# Patient Record
Sex: FEMALE | Race: WHITE | NOT HISPANIC OR LATINO | URBAN - METROPOLITAN AREA
[De-identification: names, ages, dates, MRNs, and addresses within clinical notes are randomized per-mention and may not be internally consistent; named-entity substitution may affect disease eponyms.]

---

## 2018-04-13 LAB
C TRACH RRNA SPEC QL PROBE: NEGATIVE
N GONORRHOEA RRNA SPEC QL PROBE: NEGATIVE

## 2018-05-14 ENCOUNTER — ROUTINE PRENATAL (OUTPATIENT)
Dept: PERINATAL CARE | Facility: CLINIC | Age: 28
End: 2018-05-14
Payer: COMMERCIAL

## 2018-05-14 ENCOUNTER — OFFICE VISIT (OUTPATIENT)
Dept: PERINATAL CARE | Facility: CLINIC | Age: 28
End: 2018-05-14
Payer: COMMERCIAL

## 2018-05-14 VITALS
SYSTOLIC BLOOD PRESSURE: 113 MMHG | HEART RATE: 72 BPM | BODY MASS INDEX: 24.62 KG/M2 | HEIGHT: 70 IN | WEIGHT: 172 LBS | DIASTOLIC BLOOD PRESSURE: 66 MMHG

## 2018-05-14 VITALS
WEIGHT: 172 LBS | HEART RATE: 72 BPM | BODY MASS INDEX: 24.62 KG/M2 | SYSTOLIC BLOOD PRESSURE: 113 MMHG | HEIGHT: 70 IN | DIASTOLIC BLOOD PRESSURE: 66 MMHG

## 2018-05-14 DIAGNOSIS — Z3A.13 13 WEEKS GESTATION OF PREGNANCY: ICD-10-CM

## 2018-05-14 DIAGNOSIS — O28.3 INCREASED NUCHAL TRANSLUCENCY SPACE ON FETAL ULTRASOUND: Primary | ICD-10-CM

## 2018-05-14 DIAGNOSIS — Z36.82 ENCOUNTER FOR ANTENATAL SCREENING FOR NUCHAL TRANSLUCENCY: ICD-10-CM

## 2018-05-14 PROCEDURE — 76813 OB US NUCHAL MEAS 1 GEST: CPT | Performed by: OBSTETRICS & GYNECOLOGY

## 2018-05-14 PROCEDURE — 76801 OB US < 14 WKS SINGLE FETUS: CPT | Performed by: OBSTETRICS & GYNECOLOGY

## 2018-05-14 PROCEDURE — 99241 PR OFFICE CONSULTATION NEW/ESTAB PATIENT 15 MIN: CPT | Performed by: OBSTETRICS & GYNECOLOGY

## 2018-05-14 NOTE — PROGRESS NOTES
Genetic Counseling Note        Marcos May     Appointment Date:  2018  Referred By: No ref  provider found  YOB: 1990  Partner:  Emile Roe    Pregnancy History:   Estimated Date of Delivery: 18   Estimated Gestational Age: 17 weeks 1 day     Genetic Counseling:abnormal ultrasound screen    Nathan Forbes is a(n) 33 y o  female who is here to discuss concerns related to an increased nuchal translucency detected on ultrasound at outside institution    Issues Discussed:  Average population risk: 3-4% in the average pregnancy of serious condition or birth defect  2-3% risk of mental retardation  Not all detected by prenatal testing  Ultrasound findings    Options Discussed:  Amniocentesis: risks and limitations discussed  CVS: risks and limitations discussed  Ethnic screening discussed: clinical and genetic basis of CF, SMA, Fragile X  Level II ultrasound to screen for structural anomalies  Nuchal translucency/1st trimester serum screen: goals and limitations discussed  Serum AFP screen recommended at 15-17 weeks to check for open neural tube defects  Cell free fetal DNA testing  Fetal echocardiogram    Additional Information / Impression / Plan / Tests Ordered: Nathan Forbes presents for genetic counseling to discuss concerns related to an increased nuchal translucency detected on ultrasound evaluation through her obstetrician's office  Records indicate that the nuchal translucency measurement was 3 1 mm  A nuchal translucency of this size is associated with an estimated 3 7% risk for a chromosome abnormality with an additional 2 5% risk for major physical anomaly such as a congenital heart defect and a 1 3% risk for fetal death  Overall, there is a 93% chance that this baby will be alive and well at delivery  After discussing the available prenatal diagnostic and screening procedures Nathan Forbes elected to decline prenatal diagnostic testing at this time    She is scheduled to have a follow-up ultrasound evaluation following the genetic counseling session and indicated that she already had blood drawn for cell free fetal DNA testing through her obstetrician's office  She is planning to pursue level 2 ultrasound evaluation and consideration of fetal echocardiogram at the appropriate times  During our counseling session histories were taken on the patient's family and her 's family  Scooby Clay reports that her  has a 15year-old son, with a previous partner, with a diagnosis on the autism spectrum  She indicates that this child is very high functioning and currently mainstreamed into a regular school at his appropriate grade level  We reviewed the multifactorial inheritance of autism spectrum disorders and the increased risk of approximately 5-10% for this child to did a developed a similar condition  In the absence of a known genetic diagnosis prenatal diagnosis for this condition would not be available for the current pregnancy  The patient was encouraged to make her pediatrician aware of this history  In reviewing her own family history Scooby Clay reports having a niece, her sister's daughter, with a diagnosis of schizophrenia  We reviewed the multifactorial inheritance of schizophrenia or other mental health issues  Currently, there are no genetic test clinically available to detect susceptibility this condition  She was encouraged to make her pediatrician aware of this history is well  The patient reports being of [place] descent and that her  is of [place] descent  She denies either of them having any known Buddhism ancestry  Carrier screening for CF, SMA and Fragile X was discussed  The patient elected to decline all genetic carrier screening      Lastly, we discussed the fact that it is important to keep in mind that everyone in the general population regardless of age, family history, or medical background has approximately a 3% risk of having a child with some type of her defect, genetic disease or intellectual disability  Currently there are no tests available to rule out all birth defects or health problems            Time spent with Genetic Counselor: 40 minutes

## 2018-05-16 NOTE — PROGRESS NOTES
[id number]: Ms Darwin Dinero was seen 5/14/18at 13w1d for genetic counseling and nuchal translucency ultrasound  See ultrasound report under "OB Procedures" tab  Please don't hesitate to contact our office with any concerns or questions    Robert Walsh MD

## 2018-05-16 NOTE — PATIENT INSTRUCTIONS
Thank you for choosing [facility] for your  care today  If you have any questions about your ultrasound or care, please do not hesitate to contact us or your primary obstetrician  Please return in 3 weeks at [facility] with MFLISA for your next ultrasound to check on the early anatomy  Regularly timed anatomy ultrasound should occur at 20 weeks and fetal echocardiogram at 22-24 weeks

## 2018-06-06 ENCOUNTER — ULTRASOUND (OUTPATIENT)
Dept: PERINATAL CARE | Facility: CLINIC | Age: 28
End: 2018-06-06
Payer: COMMERCIAL

## 2018-06-06 VITALS
WEIGHT: 175 LBS | DIASTOLIC BLOOD PRESSURE: 70 MMHG | BODY MASS INDEX: 25.05 KG/M2 | HEIGHT: 70 IN | HEART RATE: 80 BPM | SYSTOLIC BLOOD PRESSURE: 109 MMHG

## 2018-06-06 DIAGNOSIS — Z3A.16 16 WEEKS GESTATION OF PREGNANCY: ICD-10-CM

## 2018-06-06 DIAGNOSIS — Z36.3 ENCOUNTER FOR ANTENATAL SCREENING FOR MALFORMATION USING ULTRASOUND: Primary | ICD-10-CM

## 2018-06-06 PROCEDURE — 99212 OFFICE O/P EST SF 10 MIN: CPT | Performed by: OBSTETRICS & GYNECOLOGY

## 2018-06-06 PROCEDURE — 76805 OB US >/= 14 WKS SNGL FETUS: CPT | Performed by: OBSTETRICS & GYNECOLOGY

## 2018-07-05 ENCOUNTER — ROUTINE PRENATAL (OUTPATIENT)
Dept: PERINATAL CARE | Facility: CLINIC | Age: 28
End: 2018-07-05
Payer: COMMERCIAL

## 2018-07-05 VITALS
WEIGHT: 178.8 LBS | DIASTOLIC BLOOD PRESSURE: 66 MMHG | BODY MASS INDEX: 25.6 KG/M2 | SYSTOLIC BLOOD PRESSURE: 102 MMHG | HEART RATE: 75 BPM | HEIGHT: 70 IN

## 2018-07-05 DIAGNOSIS — Z36.86 ENCOUNTER FOR ANTENATAL SCREENING FOR CERVICAL LENGTH: ICD-10-CM

## 2018-07-05 DIAGNOSIS — O28.3 INCREASED NUCHAL TRANSLUCENCY SPACE ON FETAL ULTRASOUND: Primary | ICD-10-CM

## 2018-07-05 DIAGNOSIS — Z3A.20 20 WEEKS GESTATION OF PREGNANCY: ICD-10-CM

## 2018-07-05 PROCEDURE — 99212 OFFICE O/P EST SF 10 MIN: CPT | Performed by: OBSTETRICS & GYNECOLOGY

## 2018-07-05 PROCEDURE — 76811 OB US DETAILED SNGL FETUS: CPT | Performed by: OBSTETRICS & GYNECOLOGY

## 2018-07-05 PROCEDURE — 76817 TRANSVAGINAL US OBSTETRIC: CPT | Performed by: OBSTETRICS & GYNECOLOGY

## 2018-07-05 RX ORDER — VALACYCLOVIR HYDROCHLORIDE 1 G/1
TABLET, FILM COATED ORAL
Refills: 2 | COMMUNITY
Start: 2018-04-19

## 2018-09-27 ENCOUNTER — ULTRASOUND (OUTPATIENT)
Dept: PERINATAL CARE | Facility: CLINIC | Age: 28
End: 2018-09-27
Payer: COMMERCIAL

## 2018-09-27 VITALS
HEIGHT: 69 IN | DIASTOLIC BLOOD PRESSURE: 69 MMHG | BODY MASS INDEX: 29.79 KG/M2 | HEART RATE: 84 BPM | SYSTOLIC BLOOD PRESSURE: 106 MMHG | WEIGHT: 201.1 LBS

## 2018-09-27 DIAGNOSIS — O35.1XX0 THICKENING OF NUCHAL FOLD: ICD-10-CM

## 2018-09-27 DIAGNOSIS — Z3A.32 32 WEEKS GESTATION OF PREGNANCY: Primary | ICD-10-CM

## 2018-09-27 PROBLEM — IMO0002 THICKENING OF NUCHAL FOLD: Status: ACTIVE | Noted: 2018-09-27

## 2018-09-27 PROCEDURE — 76816 OB US FOLLOW-UP PER FETUS: CPT | Performed by: OBSTETRICS & GYNECOLOGY

## 2018-09-27 NOTE — PROGRESS NOTES
Ms Bety Luis is a 32 yo  at  28 4/7  weeks gestation who presents for a fetal ultrasound examination for evaluation of fetal growth  Elevated NT at the 12-14 scan  Reduced risk for fetal aneuploidy by sequential screen  She is asymptomatic  See Ob procedures  Ultrasound:  1  Live fetus in vertex  presentation  2    Fetal size = dates  3   No fetal anomalies observed  4  Normal LEVI 13 9  5  Placenta is anterior  and not a placenta previa  I reviewed the results of this ultrasound and answered  all the questions  Recommendations:  1  Follow-up as clinically indicated      Jerilyn Sim MD

## 2022-02-01 ENCOUNTER — VBI (OUTPATIENT)
Dept: ADMINISTRATIVE | Facility: OTHER | Age: 32
End: 2022-02-01

## 2022-02-01 NOTE — TELEPHONE ENCOUNTER
Upon review of the In Basket request we were able to locate, review, and update the patient chart as requested for Pap Smear (HPV) aka Cervical Cancer Screening  Any additional questions or concerns should be emailed to the Practice Liaisons via [email or web address] email, please do not reply via In Basket      Thank you  Nikki Leroy

## 2022-03-23 NOTE — PROGRESS NOTES
Assessment/Plan:  Pap every 3-5 years if normal, sexually transmitted infection testing as indicated, exercise most days of week, obtain appropriate diet and hydration, Calcium 1000mg + 600 vit D daily, birth control as directed  Annual mammogram starting at age 36, monthly breast self exam       Diagnoses and all orders for this visit:    Encounter for gynecological examination without abnormal finding  -     Cancel: Chlamydia/GC amplified DNA by PCR  -     Chlamydia/GC amplified DNA by PCR    IUD (intrauterine device) in place    Screen for STD (sexually transmitted disease)  -     Cancel: Chlamydia/GC amplified DNA by PCR  -     Chlamydia/GC amplified DNA by PCR    Acne vulgaris  -     clindamycin (CLINDAGEL) 1 % gel; Apply topically 2 (two) times a day    Other orders  -     amphetamine-dextroamphetamine (ADDERALL) 30 MG tablet; TAKE 1 TABLET BY MOUTH TWICE A DAY ADMINISTER DOSES AT LEAST 4-6 HOURS APART  Subjective:      Patient ID: Petar Wiggins is a 34 y o  female  Here for annual gyn Has liletta inserted 12/17/2018  Last pap 12/4/2020 neg/neg Doing well Has been having acne breakouts buttock and groin has been working out Monthly light period with liletta No abdominal or pelvic pain  Not currently sexually active as  but would like STD testing to make sure all ok  The following portions of the patient's history were reviewed and updated as appropriate: allergies, current medications, past family history, past medical history, past social history, past surgical history and problem list     Review of Systems   Constitutional: Negative for fatigue and unexpected weight change  Gastrointestinal: Negative for abdominal distention, abdominal pain, constipation and diarrhea  Genitourinary: Negative for difficulty urinating, dyspareunia, dysuria, frequency, genital sores, menstrual problem, pelvic pain, urgency, vaginal bleeding, vaginal discharge and vaginal pain     Skin:        acne Neurological: Negative for headaches  Psychiatric/Behavioral: Negative  Negative for dysphoric mood  The patient is not nervous/anxious  Objective:      /70 (BP Location: Left arm, Patient Position: Sitting, Cuff Size: Standard)   Ht 5' 10" (1 778 m)   Wt 89 6 kg (197 lb 9 6 oz)   LMP 03/03/2022 (Within Weeks)   BMI 28 35 kg/m²          Physical Exam  Vitals and nursing note reviewed  Constitutional:       General: She is not in acute distress  Appearance: Normal appearance  HENT:      Head: Normocephalic and atraumatic  Pulmonary:      Effort: Pulmonary effort is normal    Chest:   Breasts: Breasts are symmetrical       Right: Normal  No mass, nipple discharge, skin change, tenderness or axillary adenopathy  Left: Normal  No mass, nipple discharge, skin change, tenderness or axillary adenopathy  Abdominal:      General: There is no distension  Palpations: Abdomen is soft  Tenderness: There is no abdominal tenderness  There is no guarding or rebound  Genitourinary:     General: Normal vulva  Exam position: Lithotomy position  Labia:         Right: No rash, tenderness, lesion or injury  Left: No rash, tenderness, lesion or injury  Urethra: No prolapse, urethral pain, urethral swelling or urethral lesion  Vagina: Normal  No erythema or lesions  Cervix: No cervical motion tenderness, discharge, lesion or cervical bleeding  Uterus: Normal        Adnexa: Right adnexa normal and left adnexa normal         Right: No mass or tenderness  Left: No mass or tenderness  Rectum: No mass or external hemorrhoid  Comments: IUD strings noted G/C obtained  Musculoskeletal:         General: Normal range of motion  Lymphadenopathy:      Upper Body:      Right upper body: No axillary adenopathy  Left upper body: No axillary adenopathy  Lower Body: No right inguinal adenopathy  No left inguinal adenopathy  Skin:     General: Skin is warm and dry  Neurological:      Mental Status: She is alert and oriented to person, place, and time  Psychiatric:         Mood and Affect: Mood normal          Behavior: Behavior normal          Thought Content:  Thought content normal          Judgment: Judgment normal

## 2022-03-23 NOTE — PATIENT INSTRUCTIONS
Pap every 3-5 years if normal, sexually transmitted infection testing as indicated, exercise most days of week, obtain appropriate diet and hydration, Calcium 1000mg + 600 vit D daily, birth control as directed  Annual mammogram starting at age 36, monthly breast self exam

## 2022-03-24 ENCOUNTER — ANNUAL EXAM (OUTPATIENT)
Dept: OBGYN CLINIC | Facility: CLINIC | Age: 32
End: 2022-03-24

## 2022-03-24 VITALS
HEIGHT: 70 IN | BODY MASS INDEX: 28.29 KG/M2 | DIASTOLIC BLOOD PRESSURE: 70 MMHG | WEIGHT: 197.6 LBS | SYSTOLIC BLOOD PRESSURE: 120 MMHG

## 2022-03-24 DIAGNOSIS — L70.0 ACNE VULGARIS: ICD-10-CM

## 2022-03-24 DIAGNOSIS — Z97.5 IUD (INTRAUTERINE DEVICE) IN PLACE: ICD-10-CM

## 2022-03-24 DIAGNOSIS — Z01.419 ENCOUNTER FOR GYNECOLOGICAL EXAMINATION WITHOUT ABNORMAL FINDING: Primary | ICD-10-CM

## 2022-03-24 DIAGNOSIS — Z11.3 SCREEN FOR STD (SEXUALLY TRANSMITTED DISEASE): ICD-10-CM

## 2022-03-24 PROCEDURE — 99395 PREV VISIT EST AGE 18-39: CPT | Performed by: NURSE PRACTITIONER

## 2022-03-24 RX ORDER — CLINDAMYCIN PHOSPHATE 10 MG/G
GEL TOPICAL 2 TIMES DAILY
Qty: 60 G | Refills: 5 | Status: SHIPPED | OUTPATIENT
Start: 2022-03-24 | End: 2022-04-23

## 2022-03-24 RX ORDER — DEXTROAMPHETAMINE SACCHARATE, AMPHETAMINE ASPARTATE, DEXTROAMPHETAMINE SULFATE AND AMPHETAMINE SULFATE 7.5; 7.5; 7.5; 7.5 MG/1; MG/1; MG/1; MG/1
TABLET ORAL
COMMUNITY
Start: 2022-01-28

## 2022-03-26 LAB
C TRACH RRNA SPEC QL NAA+PROBE: NOT DETECTED
N GONORRHOEA RRNA SPEC QL NAA+PROBE: NOT DETECTED

## 2022-04-25 ENCOUNTER — TELEPHONE (OUTPATIENT)
Dept: OBGYN CLINIC | Facility: CLINIC | Age: 32
End: 2022-04-25

## 2022-04-25 NOTE — TELEPHONE ENCOUNTER
Rayne Cantu calling about her lab results from March appt   Courtney Garcia Informed her of normal G/C results

## 2024-02-21 ENCOUNTER — ANNUAL EXAM (OUTPATIENT)
Dept: OBGYN CLINIC | Facility: CLINIC | Age: 34
End: 2024-02-21
Payer: COMMERCIAL

## 2024-02-21 VITALS
DIASTOLIC BLOOD PRESSURE: 70 MMHG | BODY MASS INDEX: 28.49 KG/M2 | HEIGHT: 70 IN | WEIGHT: 199 LBS | SYSTOLIC BLOOD PRESSURE: 120 MMHG

## 2024-02-21 DIAGNOSIS — Z01.419 ENCOUNTER FOR ANNUAL ROUTINE GYNECOLOGICAL EXAMINATION: Primary | ICD-10-CM

## 2024-02-21 DIAGNOSIS — Z11.3 ROUTINE SCREENING FOR STI (SEXUALLY TRANSMITTED INFECTION): ICD-10-CM

## 2024-02-21 PROBLEM — Z97.5 IUD (INTRAUTERINE DEVICE) IN PLACE: Status: ACTIVE | Noted: 2024-02-21

## 2024-02-21 PROCEDURE — S0612 ANNUAL GYNECOLOGICAL EXAMINA: HCPCS | Performed by: STUDENT IN AN ORGANIZED HEALTH CARE EDUCATION/TRAINING PROGRAM

## 2024-02-21 RX ORDER — LEVONORGESTREL 52 MG/1
1 INTRAUTERINE DEVICE INTRAUTERINE
COMMUNITY
Start: 2018-12-17

## 2024-02-21 RX ORDER — PROPRANOLOL HYDROCHLORIDE 10 MG/1
TABLET ORAL
COMMUNITY
Start: 2024-02-05

## 2024-02-21 NOTE — PROGRESS NOTES
[facility] - [address], [address], [place] [address]    ASSESSMENT/PLAN: Rissa Davila is a 34 y.o.  who presents for annual gynecologic exam.    Encounter for routine gynecologic examination  - Routine well woman exam completed today.  - Cervical Cancer Screening: Current ASCCP Guidelines reviewed. Last Pap: 2020. History of abnormal: None  - HPV Vaccination status: Immunization series complete  - STI screening offered including HIV testing: GC/CT and blood work ordered  - Contraceptive counseling discussed.  Current contraception: IUD, satisfied for now. Due for removal/replacement before 2026.   - The following were reviewed in today's visit: breast self exam, STD testing, exercise, and healthy diet    Additional problems addressed during this visit:  1. Encounter for annual routine gynecological examination    2. Routine screening for STI (sexually transmitted infection)  -     Chlamydia/GC CISCO, Confirmation  -     HIV 1/2 AG/AB W REFLEX [facility] and [facility] only; Future  -     RPR, Rfx Qn RPR/Confirm TP; Future  -     Hepatitis C antibody; Future        CC:  Annual Gynecologic Examination    HPI: Rissa Davila is a 34 y.o.  who presents for annual gynecologic examination. She is without complaint today.     ROS: Negative except as noted in HPI    No LMP recorded. Patient has had an implant.  Menstrual History  Period Pattern: (!) Irregular (In setting of Liletta IUD.)  Dysmenorrhea: (!) Mild  Dysmenorrhea Symptoms: Cramping    She  reports that she is not currently sexually active. She reports using the following method of birth control/protection: I.U.D..  Sexual History  Multiple Sex Partners: No  Is Patient in a Monogamous Relationship?: No    The following portions of the patient's history were reviewed and updated as appropriate:   Past Medical History:   Diagnosis Date    Anxiety     Endometriosis     Excessive and frequent menstruation with irregular cycle  "     Past Surgical History:   Procedure Laterality Date    INSERTION OF INTRAUTERINE DEVICE (IUD)  12/2018    LAPAROSCOPY  2014    WISDOM TOOTH EXTRACTION       Family History   Problem Relation Age of Onset    Hypertension Mother     Hypertension Father     No Known Problems Sister     No Known Problems Brother     No Known Problems Maternal Grandmother     No Known Problems Maternal Grandfather     No Known Problems Paternal Grandmother     No Known Problems Paternal Grandfather     Breast cancer Neg Hx     Ovarian cancer Neg Hx     Uterine cancer Neg Hx     Colon cancer Neg Hx      Social History     Socioeconomic History    Marital status: /Civil Union     Spouse name: None    Number of children: None    Years of education: None    Highest education level: None   Occupational History    None   Tobacco Use    Smoking status: Former    Smokeless tobacco: Never    Tobacco comments:     quit day learning of pregnancy   Vaping Use    Vaping status: Some Days    Substances: Nicotine   Substance and Sexual Activity    Alcohol use: Yes     Comment: socially    Drug use: No    Sexual activity: Not Currently     Birth control/protection: I.U.D.   Other Topics Concern    None   Social History Narrative    None     Social Determinants of Health     Financial Resource Strain: Not on file   Food Insecurity: Not on file   Transportation Needs: Not on file   Physical Activity: Not on file   Stress: Not on file   Social Connections: Not on file   Intimate Partner Violence: Not on file   Housing Stability: Not on file     Outpatient Medications Marked as Taking for the 2/21/24 encounter (Annual Exam) with Kenneth Gaxiola MD   Medication    amphetamine-dextroamphetamine (ADDERALL) 30 MG tablet    levonorgestrel (Liletta, 52 MG,) 20.1 mcg/day IUD    propranolol (INDERAL) 10 mg tablet     No Known Allergies        Objective:  /70 (BP Location: Left arm, Patient Position: Sitting, Cuff Size: Large)   Ht 5' 10\" (1.778 m) "   Wt 90.3 kg (199 lb)   Breastfeeding No   BMI 28.55 kg/m²        Chaperone present? Yes: Ira Interiano MA.    General Appearance: alert and oriented, in no acute distress.   Neck/Thyroid: No thyromegaly, no thyroid nodules.  Respiratory: Unlabored breathing.  Cardiovascular: Regular rate, no peripheral edema.  Abdomen: Soft, non-tender, non-distended, no masses, no rebound or guarding.  Breast Exam: No dimpling, nipple retraction or discharge. No lumps or masses. No axillary or supraclavicular nodes.   Pelvic:       External genitalia: Normal appearance, no abnormal pigmentation, no lesions or masses. Normal Bartholin's and White's.      Urinary system: Urethral meatus normal, bladder non-tender.      Vaginal: normal mucosa without prolapse or lesions. Normal-appearing physiologic discharge.      Cervix: Normal-appearing, well-epithelialized, no gross lesions or masses. No cervical motion tenderness. IUD strings visible.       Adnexa: No adnexal masses or tenderness noted.      Uterus: Normal-sized, regular contour, midline, mobile, no uterine tenderness.  Extremities: Normal range of motion. Warm, well-perfused, non-tender.   Skin: normal, no rash or abnormalities  Neurologic: alert, oriented x3  Psychiatric: Appropriate affect, mood stable, cooperative with exam.        Kenneth Gaxiola MD  2/21/2024 4:27 PM

## 2024-02-24 LAB
C TRACH RRNA SPEC QL NAA+PROBE: NEGATIVE
N GONORRHOEA RRNA SPEC QL NAA+PROBE: NEGATIVE

## 2024-02-29 LAB
HCV AB S/CO SERPL IA: NON REACTIVE
HIV 1+2 AB+HIV1 P24 AG SERPL QL IA: NON REACTIVE
RPR SER QL: NON REACTIVE

## 2024-03-04 ENCOUNTER — TELEPHONE (OUTPATIENT)
Age: 34
End: 2024-03-04